# Patient Record
Sex: FEMALE | Race: BLACK OR AFRICAN AMERICAN | NOT HISPANIC OR LATINO | URBAN - METROPOLITAN AREA
[De-identification: names, ages, dates, MRNs, and addresses within clinical notes are randomized per-mention and may not be internally consistent; named-entity substitution may affect disease eponyms.]

---

## 2021-11-17 ENCOUNTER — LAB OUTSIDE AN ENCOUNTER (OUTPATIENT)
Dept: URBAN - METROPOLITAN AREA CLINIC 48 | Facility: CLINIC | Age: 76
End: 2021-11-17

## 2021-11-18 LAB
CREATININE POC: 0.9
PERFORMING LAB: (no result)

## 2022-01-28 ENCOUNTER — OUT OF OFFICE VISIT (OUTPATIENT)
Dept: URBAN - METROPOLITAN AREA MEDICAL CENTER 12 | Facility: MEDICAL CENTER | Age: 77
End: 2022-01-28
Payer: MEDICARE

## 2022-01-28 DIAGNOSIS — C22.0 HCC (HEPATOCELLULAR CARCINOMA): ICD-10-CM

## 2022-01-28 DIAGNOSIS — B18.2 CHRONIC HEPATITIS C: ICD-10-CM

## 2022-01-28 DIAGNOSIS — K92.1 ACUTE MELENA: ICD-10-CM

## 2022-01-28 DIAGNOSIS — D64.89 ANEMIA DUE TO OTHER CAUSE: ICD-10-CM

## 2022-01-28 PROCEDURE — G8427 DOCREV CUR MEDS BY ELIG CLIN: HCPCS | Performed by: INTERNAL MEDICINE

## 2022-01-28 PROCEDURE — 99222 1ST HOSP IP/OBS MODERATE 55: CPT | Performed by: INTERNAL MEDICINE

## 2022-01-29 ENCOUNTER — OUT OF OFFICE VISIT (OUTPATIENT)
Dept: URBAN - METROPOLITAN AREA MEDICAL CENTER 12 | Facility: MEDICAL CENTER | Age: 77
End: 2022-01-29
Payer: MEDICARE

## 2022-01-29 DIAGNOSIS — K92.1 ACUTE MELENA: ICD-10-CM

## 2022-01-29 PROCEDURE — 43235 EGD DIAGNOSTIC BRUSH WASH: CPT | Performed by: INTERNAL MEDICINE

## 2022-01-30 ENCOUNTER — OUT OF OFFICE VISIT (OUTPATIENT)
Dept: URBAN - METROPOLITAN AREA MEDICAL CENTER 12 | Facility: MEDICAL CENTER | Age: 77
End: 2022-01-30
Payer: MEDICARE

## 2022-01-30 DIAGNOSIS — K92.1 ACUTE MELENA: ICD-10-CM

## 2022-01-30 DIAGNOSIS — K63.1 COLON PERFORATION: ICD-10-CM

## 2022-01-30 PROCEDURE — 45330 DIAGNOSTIC SIGMOIDOSCOPY: CPT | Performed by: INTERNAL MEDICINE

## 2022-01-31 ENCOUNTER — OUT OF OFFICE VISIT (OUTPATIENT)
Dept: URBAN - METROPOLITAN AREA MEDICAL CENTER 12 | Facility: MEDICAL CENTER | Age: 77
End: 2022-01-31
Payer: MEDICARE

## 2022-01-31 DIAGNOSIS — C22.0 HCC (HEPATOCELLULAR CARCINOMA): ICD-10-CM

## 2022-01-31 DIAGNOSIS — D64.89 ANEMIA DUE TO OTHER CAUSE: ICD-10-CM

## 2022-01-31 DIAGNOSIS — R31.9 HEMATURIA: ICD-10-CM

## 2022-01-31 DIAGNOSIS — Z93.2 ILEOSTOMY IN PLACE: ICD-10-CM

## 2022-01-31 DIAGNOSIS — B18.2 CHRONIC HEPATITIS C: ICD-10-CM

## 2022-01-31 DIAGNOSIS — B18.2 CARRIER OF VIRAL HEPATITIS C: ICD-10-CM

## 2022-01-31 PROCEDURE — 99233 SBSQ HOSP IP/OBS HIGH 50: CPT | Performed by: INTERNAL MEDICINE

## 2022-01-31 PROCEDURE — 99232 SBSQ HOSP IP/OBS MODERATE 35: CPT | Performed by: PHYSICIAN ASSISTANT

## 2022-02-03 ENCOUNTER — OUT OF OFFICE VISIT (OUTPATIENT)
Dept: URBAN - METROPOLITAN AREA MEDICAL CENTER 12 | Facility: MEDICAL CENTER | Age: 77
End: 2022-02-03
Payer: MEDICARE

## 2022-02-03 DIAGNOSIS — R31.9 HEMATURIA: ICD-10-CM

## 2022-02-03 DIAGNOSIS — C22.0 HCC (HEPATOCELLULAR CARCINOMA): ICD-10-CM

## 2022-02-03 DIAGNOSIS — D64.89 ANEMIA DUE TO OTHER CAUSE: ICD-10-CM

## 2022-02-03 DIAGNOSIS — B18.2 CHRONIC HEPATITIS C: ICD-10-CM

## 2022-02-03 DIAGNOSIS — K56.7 ILEUS: ICD-10-CM

## 2022-02-03 PROCEDURE — 99231 SBSQ HOSP IP/OBS SF/LOW 25: CPT | Performed by: PHYSICIAN ASSISTANT

## 2022-02-07 ENCOUNTER — CLAIMS CREATED FROM THE CLAIM WINDOW (OUTPATIENT)
Dept: URBAN - METROPOLITAN AREA MEDICAL CENTER 12 | Facility: MEDICAL CENTER | Age: 77
End: 2022-02-07

## 2022-02-07 ENCOUNTER — CLAIMS CREATED FROM THE CLAIM WINDOW (OUTPATIENT)
Dept: URBAN - METROPOLITAN AREA MEDICAL CENTER 12 | Facility: MEDICAL CENTER | Age: 77
End: 2022-02-07
Payer: MEDICARE

## 2022-02-07 DIAGNOSIS — B18.2 CHRONIC HEPATITIS C: ICD-10-CM

## 2022-02-07 DIAGNOSIS — D64.89 ANEMIA DUE TO OTHER CAUSE: ICD-10-CM

## 2022-02-07 DIAGNOSIS — K92.1 ACUTE MELENA: ICD-10-CM

## 2022-02-07 DIAGNOSIS — C22.0 HCC (HEPATOCELLULAR CARCINOMA): ICD-10-CM

## 2022-02-07 DIAGNOSIS — K63.1 BOWEL PERFORATION: ICD-10-CM

## 2022-02-07 DIAGNOSIS — B18.2 CARRIER OF VIRAL HEPATITIS C: ICD-10-CM

## 2022-02-07 PROCEDURE — 99231 SBSQ HOSP IP/OBS SF/LOW 25: CPT | Performed by: PHYSICIAN ASSISTANT

## 2022-02-11 ENCOUNTER — OUT OF OFFICE VISIT (OUTPATIENT)
Dept: URBAN - METROPOLITAN AREA MEDICAL CENTER 12 | Facility: MEDICAL CENTER | Age: 77
End: 2022-02-11
Payer: MEDICARE

## 2022-02-11 DIAGNOSIS — D47.3 THROMBOCYTOSIS: ICD-10-CM

## 2022-02-11 DIAGNOSIS — B18.2 CARRIER OF VIRAL HEPATITIS C: ICD-10-CM

## 2022-02-11 DIAGNOSIS — K74.69 CIRRHOSIS, CRYPTOGENIC: ICD-10-CM

## 2022-02-11 DIAGNOSIS — R74.8 ABNORMAL ALKALINE PHOSPHATASE TEST: ICD-10-CM

## 2022-02-11 PROCEDURE — 99233 SBSQ HOSP IP/OBS HIGH 50: CPT

## 2022-02-13 ENCOUNTER — OUT OF OFFICE VISIT (OUTPATIENT)
Dept: URBAN - METROPOLITAN AREA MEDICAL CENTER 12 | Facility: MEDICAL CENTER | Age: 77
End: 2022-02-13

## 2022-02-13 ENCOUNTER — CLAIMS CREATED FROM THE CLAIM WINDOW (OUTPATIENT)
Dept: URBAN - METROPOLITAN AREA MEDICAL CENTER 12 | Facility: MEDICAL CENTER | Age: 77
End: 2022-02-13
Payer: MEDICARE

## 2022-02-13 DIAGNOSIS — B18.2 CARRIER OF VIRAL HEPATITIS C: ICD-10-CM

## 2022-02-13 DIAGNOSIS — R74.8 ABNORMAL ALKALINE PHOSPHATASE TEST: ICD-10-CM

## 2022-02-13 DIAGNOSIS — Z93.3 COLOSTOMY: ICD-10-CM

## 2022-02-13 DIAGNOSIS — K63.1 COLON PERFORATION: ICD-10-CM

## 2022-02-13 PROCEDURE — 99232 SBSQ HOSP IP/OBS MODERATE 35: CPT | Performed by: INTERNAL MEDICINE

## 2022-02-18 ENCOUNTER — OUT OF OFFICE VISIT (OUTPATIENT)
Dept: URBAN - METROPOLITAN AREA MEDICAL CENTER 12 | Facility: MEDICAL CENTER | Age: 77
End: 2022-02-18
Payer: MEDICARE

## 2022-02-18 ENCOUNTER — OFFICE VISIT (OUTPATIENT)
Dept: URBAN - METROPOLITAN AREA CLINIC 92 | Facility: CLINIC | Age: 77
End: 2022-02-18
Payer: MEDICARE

## 2022-02-18 ENCOUNTER — DASHBOARD ENCOUNTERS (OUTPATIENT)
Age: 77
End: 2022-02-18

## 2022-02-18 VITALS — DIASTOLIC BLOOD PRESSURE: 60 MMHG | SYSTOLIC BLOOD PRESSURE: 105 MMHG | TEMPERATURE: 97.3 F | HEIGHT: 65 IN

## 2022-02-18 DIAGNOSIS — K94.01 BLEEDING FROM COLOSTOMY: ICD-10-CM

## 2022-02-18 DIAGNOSIS — C22.0 HEPATOCELLULAR CARCINOMA: ICD-10-CM

## 2022-02-18 DIAGNOSIS — K57.90 DIVERTICULOSIS: ICD-10-CM

## 2022-02-18 DIAGNOSIS — K92.2 GASTROINTESTINAL HEMORRHAGE, UNSPECIFIED GASTROINTESTINAL HEMORRHAGE TYPE: ICD-10-CM

## 2022-02-18 PROCEDURE — G8427 DOCREV CUR MEDS BY ELIG CLIN: HCPCS | Performed by: STUDENT IN AN ORGANIZED HEALTH CARE EDUCATION/TRAINING PROGRAM

## 2022-02-18 PROCEDURE — 99233 SBSQ HOSP IP/OBS HIGH 50: CPT | Performed by: INTERNAL MEDICINE

## 2022-02-18 PROCEDURE — 99223 1ST HOSP IP/OBS HIGH 75: CPT | Performed by: STUDENT IN AN ORGANIZED HEALTH CARE EDUCATION/TRAINING PROGRAM

## 2022-02-18 PROCEDURE — 99214 OFFICE O/P EST MOD 30 MIN: CPT | Performed by: INTERNAL MEDICINE

## 2022-02-18 RX ORDER — BRIMONIDINE TARTRATE, TIMOLOL MALEATE 2; 5 MG/ML; MG/ML
1 DROP INTO AFFECTED EYE SOLUTION/ DROPS OPHTHALMIC TWICE A DAY
Status: ACTIVE | COMMUNITY

## 2022-02-18 NOTE — HPI-TODAY'S VISIT:
This is a 76-year-old female who now presents for follow-up.    The patient has a history of HTN, CKD stage 3, and a recent diagnosis of chronic HCV complicated by HCC which has not been treated.  The patient was admitted on 01/28/2022 with melena.  She underwent EGD on 01/29/2022 that demonstrated no significant abnormality.  Colonoscopy on 01/30/2022 that was attempted which was complicated by perforation. during the procedure, diverticulosis was visualized and sigmoid colon. She underwent exploratory laparotomy by Dr. Troy Rodriguez and underwent sigmoid resection with colostomy placement.  There was dense fibrosis and a retroperitoneal mass which was biopsied.  It demonstrated fragments of inactive ovaries without malignancy.  Her postoperative course was complicated by ileus which was treated conservatively.  She was discharged to the home earlier this week.  Prior to her discharge, the patient states that she had an episode of hematochezia  Bright red in the ostomy.  Her symptoms recur shortly after discharge and she has had bright red through the ostomy daily.  She denies any associated abdominal pain, N/V, hematemesis, or melena.

## 2022-02-18 NOTE — PHYSICAL EXAM GASTROINTESTINAL
Abdomen,  soft, nontender, nondistended,  no guarding or rigidity,  no masses palpable,  normal bowel sounds .  Ostomy was in place in the LLQ with maroon-colored blood and stool.  This stoma was visualized and there was no active bleeding around the stoma site.

## 2022-02-19 PROBLEM — 109841003: Status: ACTIVE | Noted: 2022-02-19

## 2022-02-19 PROBLEM — 397881000: Status: ACTIVE | Noted: 2022-02-19

## 2022-02-21 ENCOUNTER — OUT OF OFFICE VISIT (OUTPATIENT)
Dept: URBAN - METROPOLITAN AREA MEDICAL CENTER 12 | Facility: MEDICAL CENTER | Age: 77
End: 2022-02-21
Payer: MEDICARE

## 2022-02-21 DIAGNOSIS — K94.01 BLEEDING FROM COLOSTOMY: ICD-10-CM

## 2022-02-21 PROCEDURE — 99232 SBSQ HOSP IP/OBS MODERATE 35: CPT | Performed by: PHYSICIAN ASSISTANT

## 2022-02-22 ENCOUNTER — OUT OF OFFICE VISIT (OUTPATIENT)
Dept: URBAN - METROPOLITAN AREA MEDICAL CENTER 12 | Facility: MEDICAL CENTER | Age: 77
End: 2022-02-22
Payer: MEDICARE

## 2022-02-22 DIAGNOSIS — K92.1 ACUTE MELENA: ICD-10-CM

## 2022-02-22 PROCEDURE — 44388 COLONOSCOPY THRU STOMA SPX: CPT | Performed by: INTERNAL MEDICINE

## 2022-02-23 ENCOUNTER — OUT OF OFFICE VISIT (OUTPATIENT)
Dept: URBAN - METROPOLITAN AREA MEDICAL CENTER 12 | Facility: MEDICAL CENTER | Age: 77
End: 2022-02-23
Payer: MEDICARE

## 2022-02-23 DIAGNOSIS — K94.01 BLEEDING FROM COLOSTOMY: ICD-10-CM

## 2022-02-23 DIAGNOSIS — C22.0 HCC (HEPATOCELLULAR CARCINOMA): ICD-10-CM

## 2022-02-23 PROCEDURE — 99232 SBSQ HOSP IP/OBS MODERATE 35: CPT | Performed by: PHYSICIAN ASSISTANT

## 2024-10-22 ENCOUNTER — TELEPHONE ENCOUNTER (OUTPATIENT)
Dept: URBAN - METROPOLITAN AREA CLINIC 92 | Facility: CLINIC | Age: 79
End: 2024-10-22